# Patient Record
(demographics unavailable — no encounter records)

---

## 2024-11-06 NOTE — HISTORY OF PRESENT ILLNESS
[FreeTextEntry1] : 18yo G0 presenting for depo injection. Last dose was Aug 21st. She had questions about getting Nexplanon or hormonal IUD as she desires not having her periods. She states that she is a little bothered by having to come to the clinic every 3 months for injections. She is also complaining fishy vaginal odor but no discharge. She reports that she thinks the issue is that she has had nocturia for as far as she can remember and doesn't wake up for alarms to urinate. She also has issues with urgency where she doesn't make it to the bathroom in time.

## 2024-11-06 NOTE — PHYSICAL EXAM
[Chaperone Present] : A chaperone was present in the examining room during all aspects of the physical examination [17444] : A chaperone was present during the pelvic exam. [Appropriately responsive] : appropriately responsive [Alert] : alert [Labia Majora] : normal [Labia Minora] : normal [Normal] : normal [FreeTextEntry4] : No discharge

## 2024-11-06 NOTE — COUNSELING
[Drugs] : drugs [Contraception/ Emergency Contraception/ Safe Sexual Practices] : contraception, emergency contraception, safe sexual practices

## 2024-11-06 NOTE — PLAN
[FreeTextEntry1] : 20yo G0 here for depo f/u, vaginal odor, nocturia and urgency incontinence  - Discussed different contraceptive methods, patient desires to stay on depo.  -depo administered [id number] lot # [id number] -f/u vaginitis, will sent metrogel  -urogyn referral -RTC in  for depo